# Patient Record
Sex: MALE | Race: WHITE | NOT HISPANIC OR LATINO
[De-identification: names, ages, dates, MRNs, and addresses within clinical notes are randomized per-mention and may not be internally consistent; named-entity substitution may affect disease eponyms.]

---

## 2021-09-08 ENCOUNTER — RESULT REVIEW (OUTPATIENT)
Age: 76
End: 2021-09-08

## 2021-09-13 ENCOUNTER — APPOINTMENT (OUTPATIENT)
Dept: UROLOGY | Facility: CLINIC | Age: 76
End: 2021-09-13
Payer: MEDICARE

## 2021-09-13 ENCOUNTER — NON-APPOINTMENT (OUTPATIENT)
Age: 76
End: 2021-09-13

## 2021-09-13 VITALS
DIASTOLIC BLOOD PRESSURE: 52 MMHG | HEIGHT: 66 IN | TEMPERATURE: 97.6 F | BODY MASS INDEX: 25.71 KG/M2 | SYSTOLIC BLOOD PRESSURE: 104 MMHG | HEART RATE: 73 BPM | WEIGHT: 160 LBS

## 2021-09-13 DIAGNOSIS — Z80.0 FAMILY HISTORY OF MALIGNANT NEOPLASM OF DIGESTIVE ORGANS: ICD-10-CM

## 2021-09-13 DIAGNOSIS — F41.1 GENERALIZED ANXIETY DISORDER: ICD-10-CM

## 2021-09-13 PROBLEM — Z00.00 ENCOUNTER FOR PREVENTIVE HEALTH EXAMINATION: Status: ACTIVE | Noted: 2021-09-13

## 2021-09-13 PROCEDURE — 99203 OFFICE O/P NEW LOW 30 MIN: CPT

## 2021-09-13 PROCEDURE — 51700 IRRIGATION OF BLADDER: CPT

## 2021-09-13 PROCEDURE — A4218: CPT | Mod: NC

## 2021-09-13 RX ORDER — BENZOCAINE/BENZALKONIUM/ALOE/E 5 %-0.13 %
10 CREAM (GRAM) TOPICAL
Refills: 0 | Status: ACTIVE | COMMUNITY

## 2021-09-13 RX ORDER — TAMSULOSIN HYDROCHLORIDE 0.4 MG/1
0.4 CAPSULE ORAL
Refills: 0 | Status: ACTIVE | COMMUNITY

## 2021-09-13 NOTE — ASSESSMENT
[FreeTextEntry1] : Urinary retention:\par passed TOV today\par c/w flomax 1 capsule\par will f/u 1 week for pvr\par \par I advised to hold aspirin for today and may restart tomorrow if his orthopedist is ok.

## 2021-09-13 NOTE — HISTORY OF PRESENT ILLNESS
[Currently Experiencing ___] :  [unfilled] [FreeTextEntry1] : 77yo M s/p recent surgery on R foot/heel on September 8th, galicia was placed post-op, pt was clotting so galicia was replaced several times causing more trauma. \par Pt had stopped his aspirin 325mg bid due to the blood in urine. \par Pt was started on flomax 5 days ago at [facility] and presents today\par He usued to see urologist in past and was advised to start flomax but pt refused earlier. \par Currently he has light pink color urine. Denies fever, chills or abdominal pain\par \par Pt agrees to do tov today

## 2021-09-13 NOTE — PHYSICAL EXAM
[General Appearance - Well Developed] : well developed [General Appearance - Well Nourished] : well nourished [Well Groomed] : well groomed [General Appearance - In No Acute Distress] : no acute distress [Edema] : no peripheral edema [Respiration, Rhythm And Depth] : normal respiratory rhythm and effort [Exaggerated Use Of Accessory Muscles For Inspiration] : no accessory muscle use [Abdomen Soft] : soft [Abdomen Tenderness] : non-tender [Urethral Meatus] : meatus normal [Penis Abnormality] : normal circumcised penis [] : no rash [Oriented To Time, Place, And Person] : oriented to person, place, and time [Affect] : the affect was normal [Mood] : the mood was normal [Not Anxious] : not anxious [FreeTextEntry1] : Cast on his RLE

## 2021-09-13 NOTE — REVIEW OF SYSTEMS
[see HPI] : see HPI [Joint Pain] : joint pain [Joint Stiffness] : joint stiffness [Limb Pain] : limb pain [Difficulty Walking] : difficulty walking [Negative] : Psychiatric

## 2021-09-22 ENCOUNTER — APPOINTMENT (OUTPATIENT)
Dept: UROLOGY | Facility: CLINIC | Age: 76
End: 2021-09-22
Payer: MEDICARE

## 2021-09-22 VITALS
WEIGHT: 160 LBS | SYSTOLIC BLOOD PRESSURE: 107 MMHG | HEIGHT: 66 IN | BODY MASS INDEX: 25.71 KG/M2 | HEART RATE: 78 BPM | TEMPERATURE: 97.6 F | DIASTOLIC BLOOD PRESSURE: 56 MMHG

## 2021-09-22 DIAGNOSIS — R33.9 RETENTION OF URINE, UNSPECIFIED: ICD-10-CM

## 2021-09-22 PROCEDURE — 51798 US URINE CAPACITY MEASURE: CPT

## 2021-09-22 PROCEDURE — 99213 OFFICE O/P EST LOW 20 MIN: CPT

## 2021-09-22 NOTE — PHYSICAL EXAM
[General Appearance - Well Developed] : well developed [General Appearance - Well Nourished] : well nourished [Well Groomed] : well groomed [General Appearance - In No Acute Distress] : no acute distress [Abdomen Soft] : soft [Abdomen Tenderness] : non-tender [Edema] : no peripheral edema [] : no respiratory distress [Respiration, Rhythm And Depth] : normal respiratory rhythm and effort [Exaggerated Use Of Accessory Muscles For Inspiration] : no accessory muscle use [Oriented To Time, Place, And Person] : oriented to person, place, and time [Affect] : the affect was normal [Mood] : the mood was normal [Not Anxious] : not anxious [FreeTextEntry1] : Cast on his RLE

## 2021-09-22 NOTE — REVIEW OF SYSTEMS
[see HPI] : see HPI [Difficulty Walking] : difficulty walking [Negative] : Psychiatric [Joint Pain] : joint pain [Limb Pain] : limb pain

## 2021-09-22 NOTE — HISTORY OF PRESENT ILLNESS
[Currently Experiencing ___] :  [unfilled] [FreeTextEntry1] : 77yo M s/p recent surgery on R foot/heel returns for urinary retention\par Pt is taking 2x flomax and was started on finasteride by his other urologist. \par \par PVR 2cc today\par \par Pt also reports hx of 4-5 neg prostate biopsies for elevated PSA. He will have the documents transferred from other urologist.

## 2021-09-22 NOTE — ASSESSMENT
[FreeTextEntry1] : Urinary retention:\par c/w 2 flomax and finasteride. \par f/u 1 month for pvr\par \par pt will sign release form to forward all previous neg prostate biopsies and MRI reports from other urologist.

## 2021-10-25 ENCOUNTER — APPOINTMENT (OUTPATIENT)
Dept: UROLOGY | Facility: CLINIC | Age: 76
End: 2021-10-25
Payer: MEDICARE

## 2021-10-25 VITALS
DIASTOLIC BLOOD PRESSURE: 56 MMHG | WEIGHT: 126 LBS | BODY MASS INDEX: 20.25 KG/M2 | HEIGHT: 66 IN | SYSTOLIC BLOOD PRESSURE: 108 MMHG | TEMPERATURE: 96.5 F | HEART RATE: 73 BPM

## 2021-10-25 DIAGNOSIS — R97.20 BENIGN PROSTATIC HYPERPLASIA WITHOUT LOWER URINARY TRACT SYMPMS: ICD-10-CM

## 2021-10-25 DIAGNOSIS — N40.0 BENIGN PROSTATIC HYPERPLASIA WITHOUT LOWER URINARY TRACT SYMPMS: ICD-10-CM

## 2021-10-25 PROCEDURE — 51798 US URINE CAPACITY MEASURE: CPT

## 2021-10-25 PROCEDURE — 99213 OFFICE O/P EST LOW 20 MIN: CPT

## 2021-10-25 NOTE — HISTORY OF PRESENT ILLNESS
[FreeTextEntry1] : 75yo M s/p recent surgery on R foot/heel returns for urinary retention\par Pt lowered his tamsulosin to 1 capsule due to dizzy side effect also stopped finasteride due to concern of prostate CA\par \par PVR 24cc today\par \par Pt also reports hx of 4-5 neg prostate biopsies for elevated PSA. \par 4K score 5% on 9/2020\par MRI 2018 showed PIRADS -1 lesion with volume of 105cc [Currently Experiencing ___] :  [unfilled]

## 2021-10-25 NOTE — ASSESSMENT
[FreeTextEntry1] : Urinary retention:\par c/w 1 flomax \par f/u 1 month\par \par Discussed finasteride may be good option for patient for bothersome urinary complaints. He is concerned about prostate CA. I reassured him his prostate biopsies have been negative and has low 4K score along with neg MRI in 2018. He will follow up with his other urologist for checking PSA

## 2021-10-25 NOTE — REVIEW OF SYSTEMS
[see HPI] : see HPI [Joint Pain] : joint pain [Limb Pain] : limb pain [Difficulty Walking] : difficulty walking [Negative] : Psychiatric

## 2022-01-26 ENCOUNTER — APPOINTMENT (OUTPATIENT)
Dept: UROLOGY | Facility: CLINIC | Age: 77
End: 2022-01-26

## 2022-08-28 DIAGNOSIS — M92.61 JUVENILE OSTEOCHONDROSIS OF TARSUS, RIGHT ANKLE: ICD-10-CM

## 2022-08-30 ENCOUNTER — APPOINTMENT (OUTPATIENT)
Dept: ORTHOPEDIC SURGERY | Facility: CLINIC | Age: 77
End: 2022-08-30

## 2022-08-30 VITALS — HEIGHT: 66 IN | BODY MASS INDEX: 19.77 KG/M2 | WEIGHT: 123 LBS

## 2022-08-30 DIAGNOSIS — I35.2 NONRHEUMATIC AORTIC (VALVE) STENOSIS WITH INSUFFICIENCY: ICD-10-CM

## 2022-08-30 DIAGNOSIS — M72.2 PLANTAR FASCIAL FIBROMATOSIS: ICD-10-CM

## 2022-08-30 DIAGNOSIS — M76.61 ACHILLES TENDINITIS, RIGHT LEG: ICD-10-CM

## 2022-08-30 PROCEDURE — 99213 OFFICE O/P EST LOW 20 MIN: CPT

## 2022-08-30 NOTE — ASSESSMENT
[FreeTextEntry1] : 76 yo male presenting with right plantar fasciitis s/p excision of right nicole's deformity. Patient reports intermittent burning sensation at achilles tendon.\par -Discussed with patient that he should continue HEP for plantar fasciitis.\par -Discussed with patient that if pain worsens in achilles or plantar fascia that he may be indicated for PRP injections. Patient states that since his pain is intermittent that he is not interested in injections.\par -Activities as tolerated\par -Rest, ice, compression, elevation, NSAIDs PRN for pain\par -All questions answered\par -F/u PRN\par

## 2022-08-30 NOTE — HISTORY OF PRESENT ILLNESS
[Gradual] : gradual [0] : 0 [Burning] : burning [Intermittent] : intermittent [Rest] : rest [Retired] : Work status: retired [de-identified] : Patient is here for a follow up for his right ankle. Patient had right halgunds excision with achilles tendon reconstruction on 9/8/21. Patient has also been treated for Plantar fasciitis of right foot. Patient would like to discuss some of the SX. Patient states he gets some burning when riding his bike.  [] : Post Surgical Visit: no [FreeTextEntry1] : Right ankle.foot [FreeTextEntry3] : 2021 [FreeTextEntry5] : Patient states NKI

## 2022-08-30 NOTE — PHYSICAL EXAM
[de-identified] : Examination of the right foot and ankle is as follows:\par INSPECTION: well healed scar over achilles tendon, mild swelling over achilles tendon, but no abrasion, no laceration, no erythema, no ecchymosis, no gross deformity, no ecchymosis of foot or ankle\par PALPATION: tenderness over plantar heel\par ROM: dorsiflexion 20 degrees, plantar flexion 40 degrees, inversion 30 degrees, eversion 20 degrees\par STRENGTH: dorsiflexion 5/5, plantarflexion 5/5, inversion 5/5, eversion 5/5, EHL 5/5, FHL 5/5\par TESTING: negative Aviles test\par VASCULAR: dorsalis pedis pulse: 1+, posterior tibialis pulse: 1+\par NEURO: Sensation present to light touch in all distributions\par GAIT: non-antalgic, but patient ambulates without assistive device

## 2022-12-15 ENCOUNTER — APPOINTMENT (OUTPATIENT)
Dept: ORTHOPEDIC SURGERY | Facility: CLINIC | Age: 77
End: 2022-12-15

## 2023-04-18 DIAGNOSIS — G56.03 CARPAL TUNNEL SYNDROM,BILATERAL UPPER LIMBS: ICD-10-CM

## 2023-04-18 DIAGNOSIS — Z86.59 PERSONAL HISTORY OF OTHER MENTAL AND BEHAVIORAL DISORDERS: ICD-10-CM

## 2023-04-18 DIAGNOSIS — E78.5 HYPERLIPIDEMIA, UNSPECIFIED: ICD-10-CM

## 2023-04-18 DIAGNOSIS — I10 ESSENTIAL (PRIMARY) HYPERTENSION: ICD-10-CM

## 2023-04-18 DIAGNOSIS — Z86.79 PERSONAL HISTORY OF OTHER DISEASES OF THE CIRCULATORY SYSTEM: ICD-10-CM

## 2023-04-18 DIAGNOSIS — R00.2 PALPITATIONS: ICD-10-CM

## 2023-04-18 DIAGNOSIS — Z85.828 PERSONAL HISTORY OF OTHER MALIGNANT NEOPLASM OF SKIN: ICD-10-CM

## 2023-04-19 ENCOUNTER — APPOINTMENT (OUTPATIENT)
Dept: VASCULAR SURGERY | Facility: CLINIC | Age: 78
End: 2023-04-19
Payer: MEDICARE

## 2023-04-19 VITALS
SYSTOLIC BLOOD PRESSURE: 118 MMHG | DIASTOLIC BLOOD PRESSURE: 58 MMHG | HEIGHT: 66 IN | BODY MASS INDEX: 20.09 KG/M2 | WEIGHT: 125 LBS

## 2023-04-19 DIAGNOSIS — F19.11 OTHER PSYCHOACTIVE SUBSTANCE ABUSE, IN REMISSION: ICD-10-CM

## 2023-04-19 DIAGNOSIS — Z83.3 FAMILY HISTORY OF DIABETES MELLITUS: ICD-10-CM

## 2023-04-19 DIAGNOSIS — F10.21 ALCOHOL DEPENDENCE, IN REMISSION: ICD-10-CM

## 2023-04-19 DIAGNOSIS — Z86.39 PERSONAL HISTORY OF OTHER ENDOCRINE, NUTRITIONAL AND METABOLIC DISEASE: ICD-10-CM

## 2023-04-19 DIAGNOSIS — Z78.9 OTHER SPECIFIED HEALTH STATUS: ICD-10-CM

## 2023-04-19 DIAGNOSIS — F41.9 ANXIETY DISORDER, UNSPECIFIED: ICD-10-CM

## 2023-04-19 DIAGNOSIS — Z86.59 PERSONAL HISTORY OF OTHER MENTAL AND BEHAVIORAL DISORDERS: ICD-10-CM

## 2023-04-19 DIAGNOSIS — M19.90 UNSPECIFIED OSTEOARTHRITIS, UNSPECIFIED SITE: ICD-10-CM

## 2023-04-19 DIAGNOSIS — F32.A DEPRESSION, UNSPECIFIED: ICD-10-CM

## 2023-04-19 DIAGNOSIS — Z81.8 FAMILY HISTORY OF OTHER MENTAL AND BEHAVIORAL DISORDERS: ICD-10-CM

## 2023-04-19 DIAGNOSIS — Z87.891 PERSONAL HISTORY OF NICOTINE DEPENDENCE: ICD-10-CM

## 2023-04-19 DIAGNOSIS — Z77.22 CONTACT WITH AND (SUSPECTED) EXPOSURE TO ENVIRONMENTAL TOBACCO SMOKE (ACUTE) (CHRONIC): ICD-10-CM

## 2023-04-19 PROCEDURE — 99203 OFFICE O/P NEW LOW 30 MIN: CPT

## 2023-04-19 RX ORDER — IRBESARTAN 75 MG/1
75 TABLET ORAL
Refills: 0 | Status: DISCONTINUED | COMMUNITY
End: 2023-04-19

## 2023-04-19 RX ORDER — TAMSULOSIN HYDROCHLORIDE 0.4 MG/1
0.4 CAPSULE ORAL DAILY
Qty: 180 | Refills: 1 | Status: DISCONTINUED | COMMUNITY
Start: 2021-09-22 | End: 2023-04-19

## 2023-04-19 NOTE — PHYSICAL EXAM
[Carotid Bruits] : carotid bruit  [Murmur] : murmur was appreciated  [Right Carotid Bruit] : right carotid bruit heard [Left Carotid Bruit] : left carotid bruit heard [2+] : left 2+ [No Rash or Lesion] : No rash or lesion [JVD] : no jugular venous distention  [Ankle Swelling (On Exam)] : not present [Varicose Veins Of Lower Extremities] : not present [Abdomen Masses] : No abdominal masses [Abdomen Tenderness] : ~T ~M No abdominal tenderness [de-identified] : Very pleasant, no acute distress.  Patient came with his wife. [de-identified] : Transmitted systolic bruit in both carotid arteries. [de-identified] : 2+ systolic murmur

## 2023-04-19 NOTE — HISTORY OF PRESENT ILLNESS
[FreeTextEntry1] : Being referred by Dr. Sullivan to be evaluated for thoracic aortic aneurysm.  Patient had CT scan of the chest done which showed 4.3 cm ascending aortic aneurysm.  Patient has bicuspid aortic valve which has been monitored by his cardiologist.  Patient is very active 77-year-old gentleman who exercises daily.  He denies any chest pain, palpitation, shortness of breath.  Patient has no family history of the aneurysm.  She is non-smoker for more than 50 years.

## 2023-04-24 PROBLEM — M19.90 ARTHRITIS: Status: ACTIVE | Noted: 2023-04-19

## 2023-04-24 PROBLEM — Z86.39 HISTORY OF HYPERLIPIDEMIA: Status: RESOLVED | Noted: 2023-04-24 | Resolved: 2023-04-24

## 2023-04-24 PROBLEM — F10.21 ALCOHOLISM IN RECOVERY: Status: ACTIVE | Noted: 2022-08-30

## 2023-04-24 PROBLEM — F19.11 HISTORY OF SUBSTANCE ABUSE: Status: RESOLVED | Noted: 2023-04-19 | Resolved: 2023-04-24

## 2023-04-24 PROBLEM — Z81.8 FHX: MENTAL ILLNESS: Status: ACTIVE | Noted: 2023-04-19

## 2023-04-24 PROBLEM — Z78.9 CAFFEINE USE: Status: ACTIVE | Noted: 2023-04-19

## 2023-04-24 PROBLEM — Z77.22 SECONDHAND SMOKE EXPOSURE: Status: ACTIVE | Noted: 2023-04-19

## 2023-04-24 PROBLEM — Z86.59 HISTORY OF OBSESSIVE COMPULSIVE DISORDER: Status: RESOLVED | Noted: 2023-04-19 | Resolved: 2023-04-24

## 2023-04-24 PROBLEM — Z83.3 FAMILY HISTORY OF DIABETES MELLITUS: Status: ACTIVE | Noted: 2023-04-24

## 2023-04-24 PROBLEM — F41.9 ANXIETY: Status: ACTIVE | Noted: 2023-04-19

## 2023-04-24 RX ORDER — PROPRANOLOL HYDROCHLORIDE 10 MG/1
10 TABLET ORAL
Refills: 0 | Status: ACTIVE | COMMUNITY

## 2023-04-24 RX ORDER — IBUPROFEN 200 MG/1
200 TABLET, COATED ORAL
Refills: 0 | Status: ACTIVE | COMMUNITY

## 2023-04-24 RX ORDER — ELECTROLYTES/DEXTROSE
SOLUTION, ORAL ORAL
Refills: 0 | Status: ACTIVE | COMMUNITY

## 2023-04-24 RX ORDER — ATORVASTATIN CALCIUM 20 MG/1
20 TABLET, FILM COATED ORAL DAILY
Refills: 0 | Status: ACTIVE | COMMUNITY

## 2023-04-24 RX ORDER — LORAZEPAM 0.5 MG/1
0.5 TABLET ORAL
Refills: 0 | Status: ACTIVE | COMMUNITY

## 2023-10-01 PROBLEM — G56.03 CARPAL TUNNEL SYNDROME, BILATERAL UPPER LIMBS: Status: RESOLVED | Noted: 2023-04-18 | Resolved: 2023-10-01

## 2024-01-16 ENCOUNTER — APPOINTMENT (OUTPATIENT)
Dept: ORTHOPEDIC SURGERY | Facility: CLINIC | Age: 79
End: 2024-01-16
Payer: MEDICARE

## 2024-01-16 VITALS — BODY MASS INDEX: 21.21 KG/M2 | WEIGHT: 132 LBS | HEIGHT: 66 IN

## 2024-01-16 DIAGNOSIS — M23.92 UNSPECIFIED INTERNAL DERANGEMENT OF LEFT KNEE: ICD-10-CM

## 2024-01-16 DIAGNOSIS — M17.12 UNILATERAL PRIMARY OSTEOARTHRITIS, LEFT KNEE: ICD-10-CM

## 2024-01-16 PROCEDURE — 73562 X-RAY EXAM OF KNEE 3: CPT | Mod: FY,LT

## 2024-01-16 PROCEDURE — 99213 OFFICE O/P EST LOW 20 MIN: CPT

## 2024-01-16 RX ORDER — FAMOTIDINE 10 MG/1
10 TABLET, FILM COATED ORAL
Refills: 0 | Status: DISCONTINUED | COMMUNITY
End: 2024-01-16

## 2024-01-16 NOTE — PHYSICAL EXAM
[de-identified] : Examination of the left knee is as follows: INSPECTION: mild effusion, but no ecchymosis, no erythema, no atrophy, no deformities of quad tendon or of patellar tendon PALPATION: lateral joint line tenderness, but no palpable mass, no obvious defects, no increased warmth, no calf tenderness ROM: flexion 125 degrees, but extension 0 degrees STRENGTH: quadriceps 5/5, hamstring 5/5 TESTING: ligamentously stable NEURO: light touch is intact throughout GAIT: normal gait neg Mc[place]rays  X-rays of the left knee is as follows:  Knee 3 view in the anteroposterior, lateral, skyline views: moderate tricompartmental OA with medial joint space narrowing

## 2024-01-16 NOTE — HISTORY OF PRESENT ILLNESS
[Gradual] : gradual [Constant] : constant [Rest] : rest [Standing] : standing [Retired] : Work status: retired [0] : 0 [Meds] : meds [de-identified] : Patient is here today for his left knee. Pain began 3 months ago. NKI. Patient reports that pain occurred after hiking 5 miles. Reports that the pain is located over lateral knee and superior to patella. Reports that his pain improves with activity and worsens with standing and descending stairs. Reports that he has minimal pain today. Denies taking OTC NSAIDs for his pain but notes that he was taking Advil prior. [] : Post Surgical Visit: no [FreeTextEntry1] : left knee [FreeTextEntry3] : 3 months ago [FreeTextEntry5] : [facility].

## 2024-01-16 NOTE — ASSESSMENT
[FreeTextEntry1] : 77yo M with mild knee OA with possible meniscus tear.  Pain is tolerable at this stage.  Will recommend nonsurgical management at this time.  -Activities as tolerated -Rest, ice, compression, elevation, NSAIDs PRN for pain.  -All questions answered -F/u prn  The diagnosis was explained in detail. The potential non-surgical and surgical treatments were reviewed. The relative risks and benefits of each option were considered relative to the patients age, activity level, medical history, symptom severity and previously attempted treatments.  The patient was advised to consult with their primary medical provider prior to initiation of any new medications to reduce the risk of adverse effects specific to their long-term home medications and medical history. The risk of gastrointestinal irritation and kidney injury specific to long-term NSAID use was discussed.

## 2024-01-26 ENCOUNTER — APPOINTMENT (OUTPATIENT)
Dept: ORTHOPEDIC SURGERY | Facility: CLINIC | Age: 79
End: 2024-01-26
Payer: MEDICARE

## 2024-01-26 PROCEDURE — 72100 X-RAY EXAM L-S SPINE 2/3 VWS: CPT | Mod: FY

## 2024-01-26 PROCEDURE — 99214 OFFICE O/P EST MOD 30 MIN: CPT

## 2024-01-26 PROCEDURE — 73503 X-RAY EXAM HIP UNI 4/> VIEWS: CPT | Mod: FY,RT

## 2024-01-26 NOTE — HISTORY OF PRESENT ILLNESS
[Lower back] : lower back [Sudden] : sudden [Dull/Aching] : dull/aching [Throbbing] : throbbing [Intermittent] : intermittent [Meds] : meds [Heat] : heat [Retired] : Work status: retired [de-identified] : Patient presents today with lower back pain for 5 days after falling on ice. Patient was last seen on 11/4/20.  Patient states he has right sided pain radiating into the right hip and groin. Patient states his legs "feels like rubber bands". Patient states he feels an "electric" sensation the front of his thighs when he passes gas. Patient states he feels increased pain when standing up straight. Patient states he has to squat down to relieve the pressure and tightness. Patient admits to using heat. Patient admits to taking Advil and Tylenol for pain. Patient denies recent imaging.    Patient had bilateral ELISE with Dr. Rader in the [facility]   [] : no

## 2024-01-26 NOTE — IMAGING
[Disc space narrowing] : Disc space narrowing [Spondylolithesis] : Spondylolithesis [Right] : right hip with pelvis [AP] : anteroposterior [Components well fixed, in good position] : Components well fixed, in good position [Lateral] : lateral [FreeTextEntry1] : Spondy L4-5, DDD multilevel

## 2024-01-26 NOTE — ASSESSMENT
[FreeTextEntry1] : 77 yo male presents today for follow up on his lumbar spine. Patient with persistent low back pain following recent fall on the right side. x-rays show multilevel degenerative changes. Recommend MRI for further evaluation.   - Patient given prescription for MRI, follow up after study is completed to discuss results.   - Recommend physical therapy to regain range of motion, strengthening and symptomatic improvement. Prescription given in office today.   - Recommend NSAIDs PRN - Recommend heating pad use to decrease muscle spasm - Discussed the importance of home exercises, including but not limited to hamstring stretching and core strengthening   Patient was educated on their diagnosis today. All questions answered and patient expressed understanding.  Follow up after MRI

## 2024-01-26 NOTE — PHYSICAL EXAM
[de-identified] : Constitutional: Well groomed and developed.  Respiratory: Normal, unlabored breathing. No use of accessory muscles.  Skin: No rashes or ulcers. Skin warm and dry.  Psychiatric: Oriented to time, place, person and event. No acute distress. Gait: Heel to toe Patient able to walk on toes and heels.    Lumbar spine:  Posture: Normal on coronal and sagittal alignment  AROM:  Flexion 40 Extension 5 Moderate pain with simulated truncal motion   Tenderness:  Thoracic: No tenderness on palpation  Lumbar: Moderate tenderness on palpation  Sacrum/coccyx: no tenderness on palpation  Greater trochanteric bursa:  No tenderness  PSIS: None    Motor:                         R             L LE:                                IS                    5             5 Quad              5             5 TA                  5             5 EHL                5             5 Gastroc         5             5                 R  L DTR: Patella  2+  2+ Achilles  2+  2+  Sensory: Light touch sensation intact T12-S1  Babinski's Sign: Negative bilaterally  Straight leg raise test: Negative bilaterally  TOPHER test: negative bilaterally   Examination of the bilateral hips: Strength 5/5 Flexion 90 degrees bilaterally

## 2024-02-09 ENCOUNTER — APPOINTMENT (OUTPATIENT)
Dept: VASCULAR SURGERY | Facility: CLINIC | Age: 79
End: 2024-02-09
Payer: MEDICARE

## 2024-02-09 VITALS
HEIGHT: 66 IN | DIASTOLIC BLOOD PRESSURE: 62 MMHG | BODY MASS INDEX: 21.21 KG/M2 | HEART RATE: 72 BPM | WEIGHT: 132 LBS | OXYGEN SATURATION: 99 % | SYSTOLIC BLOOD PRESSURE: 124 MMHG

## 2024-02-09 DIAGNOSIS — I71.21 ANEURYSM OF THE ASCENDING AORTA, WITHOUT RUPTURE: ICD-10-CM

## 2024-02-09 PROCEDURE — 99214 OFFICE O/P EST MOD 30 MIN: CPT

## 2024-02-09 NOTE — PHYSICAL EXAM
[JVD] : no jugular venous distention  [Carotid Bruits] : carotid bruit  [Normal Breath Sounds] : Normal breath sounds [Murmur] : murmur was appreciated  [2+] : left 2+ [Right Carotid Bruit] : right carotid bruit heard [Left Carotid Bruit] : left carotid bruit heard [1+] : left 1+ [Ankle Swelling (On Exam)] : not present [Varicose Veins Of Lower Extremities] : not present [] : not present [Abdomen Masses] : No abdominal masses [No Rash or Lesion] : No rash or lesion [Anxious] : anxious [de-identified] : No acute distress

## 2024-02-09 NOTE — HISTORY OF PRESENT ILLNESS
[FreeTextEntry1] : Patient is follow-up for an ascending thoracic aneurysm.  Recent chest CTA showed stable 4.2 cm dilatation of the aortic root and the ascending segment.  Patient denies any chest pain.  Blood pressure is under good control.

## 2024-02-09 NOTE — ASSESSMENT
[FreeTextEntry1] : I recommended 1 year follow-up with chest CT.  I spent more than 30 minutes reviewing patient's CAT scan and with the patient.

## 2024-04-04 ENCOUNTER — APPOINTMENT (OUTPATIENT)
Dept: ORTHOPEDIC SURGERY | Facility: CLINIC | Age: 79
End: 2024-04-04
Payer: MEDICARE

## 2024-04-04 VITALS — HEIGHT: 66 IN | WEIGHT: 132 LBS | BODY MASS INDEX: 21.21 KG/M2

## 2024-04-04 DIAGNOSIS — M54.16 RADICULOPATHY, LUMBAR REGION: ICD-10-CM

## 2024-04-04 DIAGNOSIS — M51.36 OTHER INTERVERTEBRAL DISC DEGENERATION, LUMBAR REGION: ICD-10-CM

## 2024-04-04 DIAGNOSIS — M51.37 OTHER INTERVERTEBRAL DISC DEGENERATION, LUMBOSACRAL REGION: ICD-10-CM

## 2024-04-04 DIAGNOSIS — M43.16 SPONDYLOLISTHESIS, LUMBAR REGION: ICD-10-CM

## 2024-04-04 DIAGNOSIS — M47.817 SPONDYLOSIS W/OUT MYELOPATHY OR RADICULOPATHY, LUMBOSACRAL REGION: ICD-10-CM

## 2024-04-04 DIAGNOSIS — M48.061 SPINAL STENOSIS, LUMBAR REGION WITHOUT NEUROGENIC CLAUDICATION: ICD-10-CM

## 2024-04-04 PROCEDURE — 99214 OFFICE O/P EST MOD 30 MIN: CPT

## 2024-04-04 NOTE — DATA REVIEWED
[MRI] : MRI [Lumbar Spine] : lumbar spine [I independently reviewed and interpreted images and report] : I independently reviewed and interpreted images and report [FreeTextEntry1] : 2/2024 MRI of L-Spine was reviewed today and is as follows:  DDD L2-S1 Moderate stenosis L2-3 Moderate stenosis L3-4 Grade 1 spondylolisthesis L4-5 with bilateral foraminal stenosis  Bilateral foraminal stenosis L5-S1

## 2024-04-04 NOTE — HISTORY OF PRESENT ILLNESS
[Lower back] : lower back [Sudden] : sudden [Dull/Aching] : dull/aching [Throbbing] : throbbing [Intermittent] : intermittent [Meds] : meds [Heat] : heat [Retired] : Work status: retired [de-identified] : Follow up lumbar spine MRI Results at [facility]. Patient states he feels an improvement since last visit. Patient admits to finishing PT some relief. Patient admits to doing HEP PRN. Patient admits to taking Advil and Tylenol for pain.  Today's Pain 0/10  [] : no

## 2024-04-04 NOTE — PHYSICAL EXAM
[de-identified] : Constitutional: Well groomed and developed.  Respiratory: Normal, unlabored breathing. No use of accessory muscles.  Skin: No rashes or ulcers. Skin warm and dry.  Psychiatric: Oriented to time, place, person and event. No acute distress. Gait: Heel to toe Patient able to walk on toes and heels.    Lumbar spine:  Posture: Normal on coronal and sagittal alignment  AROM:  Flexion 40 Extension 5 Moderate pain with simulated truncal motion   Tenderness:  Thoracic: No tenderness on palpation  Lumbar: Moderate tenderness on palpation  Sacrum/coccyx: no tenderness on palpation  Greater trochanteric bursa:  No tenderness  PSIS: None    Motor:                         R             L LE:                                IS                    5             5 Quad              5             5 TA                  5             5 EHL                5             5 Gastroc         5             5                 R  L DTR: Patella  2+  2+ Achilles  2+  2+  Sensory: Light touch sensation intact T12-S1  Babinski's Sign: Negative bilaterally  Straight leg raise test: Negative bilaterally  TOPHER test: negative bilaterally   Examination of the bilateral hips: Strength 5/5 Flexion 90 degrees bilaterally

## 2024-04-04 NOTE — ASSESSMENT
[FreeTextEntry1] : 2/2024 MRI of L-Spine was reviewed today and is as follows:  DDD L2-S1 Moderate stenosis L2-3 Moderate stenosis L3-4 Grade 1 spondylolisthesis L4-5 with bilateral foraminal stenosis  Bilateral foraminal stenosis L5-S1  77 yo male presents today for follow up on his lumbar spine. MRI detailed above. Patient with minimal pain at this time following PT and HEP. I recommend patient watch and wait at this time. May proceed with further management if pain returns.    - Patient will continue HEP. Emphasized daily stretching and strengthening.   - Recommend NSAIDs PRN - Recommend heating pad use to decrease muscle spasm - Discussed the importance of home exercises, including but not limited to hamstring stretching and core strengthening   Patient was educated on their diagnosis today. All questions answered and patient expressed understanding.  Follow up PRN

## 2024-04-04 NOTE — IMAGING
[Disc space narrowing] : Disc space narrowing [Spondylolithesis] : Spondylolithesis [Right] : right hip with pelvis [AP] : anteroposterior [Lateral] : lateral [Components well fixed, in good position] : Components well fixed, in good position [FreeTextEntry1] : Spondy L4-5, DDD multilevel

## 2024-07-24 ENCOUNTER — APPOINTMENT (OUTPATIENT)
Dept: ORTHOPEDIC SURGERY | Facility: CLINIC | Age: 79
End: 2024-07-24
Payer: MEDICARE

## 2024-07-24 VITALS — HEIGHT: 66 IN | BODY MASS INDEX: 21.21 KG/M2 | WEIGHT: 132 LBS

## 2024-07-24 DIAGNOSIS — M25.521 PAIN IN RIGHT ELBOW: ICD-10-CM

## 2024-07-24 PROCEDURE — 99203 OFFICE O/P NEW LOW 30 MIN: CPT

## 2024-07-24 PROCEDURE — 99213 OFFICE O/P EST LOW 20 MIN: CPT

## 2024-07-25 PROBLEM — M25.521 RIGHT ELBOW PAIN: Status: ACTIVE | Noted: 2024-07-25

## 2024-07-25 NOTE — IMAGING
[de-identified] : RIGHT ELBOW EXAM Minimal ttp at lateral epicondyle and distal biceps Skin intact No deformity, edema, or ecchymosis Hand with brisk capillary refill, warm and well perfused Motor function intact to AIN, PIN, ulnar nerves Sensation intact median, ulnar, radial nerves  Elbow ROM   Flexion 135   Extension to 0   Supination 85   Pronation 85  No elbow instability noted Strength for elbow flexion & extension is 5/5 Hand and wrist motion is intact and full Patient able to make a composite fist

## 2024-07-25 NOTE — HISTORY OF PRESENT ILLNESS
[de-identified] : Age: 79 year M PMHx: HTN, HLD Hand Dominance: RHD Chief Complaint: Right elbow pain s/p overexertion approx. 07/10/24. Patient reports that he was pushing the lawnmower for 2.5 hours and notes that his right elbow felt tight. Patient states that he was unable to extend his right elbow due to discomfort. Patient states that his symptoms have improved since the initial injury, but would still like to get evaluated as he is still unable to fully extend his right arm. Denies numbness/tingling.   Trauma: s/p overexertion Outside Imaging/Treatment: none OTC Medications: Advil PRN with relief OT/PT: none Bracing: none Pain worse with: exertion, extension of the right arm Pain better with: rest

## 2024-07-25 NOTE — ASSESSMENT
[FreeTextEntry1] : Right Lateral Epicondylitis and distal biceps tendonitis The diagnosis of lateral epicondylitis and treatment options were discussed at length with the patient today.  I discussed that in terms of the natural history of the condition, it can sometimes take many months to improve.  I discussed treatment options including observation, activity modification including avoiding lifting with the hand pronated and instead lifting with the hand supinated, oral anti-inflammatories, hand therapy, counterforce brace, and steroid injection.  I discussed that for the steroid injection, the literature proves this to be no better than a placebo, however, it is still a potential option for the patient.  Furthermore, if this condition is recalcitrant, I discussed obtaining an MRI scan to assess both the lateral epicondyle as well as the radiocapitellar joint and specifically for a potential plica that could be impinging on the radiocapitellar joint. All of the patient's questions were answered to their satisfaction.  F/u prn

## 2024-07-25 NOTE — HISTORY OF PRESENT ILLNESS
[de-identified] : Age: 79 year M PMHx: HTN, HLD Hand Dominance: RHD Chief Complaint: Right elbow pain s/p overexertion approx. 07/10/24. Patient reports that he was pushing the lawnmower for 2.5 hours and notes that his right elbow felt tight. Patient states that he was unable to extend his right elbow due to discomfort. Patient states that his symptoms have improved since the initial injury, but would still like to get evaluated as he is still unable to fully extend his right arm. Denies numbness/tingling.   Trauma: s/p overexertion Outside Imaging/Treatment: none OTC Medications: Advil PRN with relief OT/PT: none Bracing: none Pain worse with: exertion, extension of the right arm Pain better with: rest

## 2024-07-25 NOTE — IMAGING
[de-identified] : RIGHT ELBOW EXAM Minimal ttp at lateral epicondyle and distal biceps Skin intact No deformity, edema, or ecchymosis Hand with brisk capillary refill, warm and well perfused Motor function intact to AIN, PIN, ulnar nerves Sensation intact median, ulnar, radial nerves  Elbow ROM   Flexion 135   Extension to 0   Supination 85   Pronation 85  No elbow instability noted Strength for elbow flexion & extension is 5/5 Hand and wrist motion is intact and full Patient able to make a composite fist

## 2024-12-04 ENCOUNTER — APPOINTMENT (OUTPATIENT)
Dept: ORTHOPEDIC SURGERY | Facility: CLINIC | Age: 79
End: 2024-12-04
Payer: MEDICARE

## 2024-12-04 DIAGNOSIS — M54.16 RADICULOPATHY, LUMBAR REGION: ICD-10-CM

## 2024-12-04 DIAGNOSIS — M51.379 OTH INTVRT DISC DEGEN, LUMBOSACR W/O LUM BCK OR LW EXTRM PN: ICD-10-CM

## 2024-12-04 DIAGNOSIS — M47.817 SPONDYLOSIS W/OUT MYELOPATHY OR RADICULOPATHY, LUMBOSACRAL REGION: ICD-10-CM

## 2024-12-04 DIAGNOSIS — M51.369: ICD-10-CM

## 2024-12-04 DIAGNOSIS — M48.061 SPINAL STENOSIS, LUMBAR REGION WITHOUT NEUROGENIC CLAUDICATION: ICD-10-CM

## 2024-12-04 DIAGNOSIS — M43.16 SPONDYLOLISTHESIS, LUMBAR REGION: ICD-10-CM

## 2024-12-04 PROCEDURE — 99214 OFFICE O/P EST MOD 30 MIN: CPT

## 2025-03-05 ENCOUNTER — APPOINTMENT (OUTPATIENT)
Dept: ORTHOPEDIC SURGERY | Facility: CLINIC | Age: 80
End: 2025-03-05
Payer: MEDICARE

## 2025-03-05 DIAGNOSIS — M51.369: ICD-10-CM

## 2025-03-05 DIAGNOSIS — M43.16 SPONDYLOLISTHESIS, LUMBAR REGION: ICD-10-CM

## 2025-03-05 DIAGNOSIS — M48.061 SPINAL STENOSIS, LUMBAR REGION WITHOUT NEUROGENIC CLAUDICATION: ICD-10-CM

## 2025-03-05 DIAGNOSIS — M54.16 RADICULOPATHY, LUMBAR REGION: ICD-10-CM

## 2025-03-05 DIAGNOSIS — M47.817 SPONDYLOSIS W/OUT MYELOPATHY OR RADICULOPATHY, LUMBOSACRAL REGION: ICD-10-CM

## 2025-03-05 DIAGNOSIS — M51.379 OTH INTVRT DISC DEGEN, LUMBOSACR W/O LUM BCK OR LW EXTRM PN: ICD-10-CM

## 2025-03-05 PROCEDURE — 99213 OFFICE O/P EST LOW 20 MIN: CPT

## 2025-05-15 ENCOUNTER — APPOINTMENT (OUTPATIENT)
Dept: ORTHOPEDIC SURGERY | Facility: CLINIC | Age: 80
End: 2025-05-15
Payer: MEDICARE

## 2025-05-15 DIAGNOSIS — M43.16 SPONDYLOLISTHESIS, LUMBAR REGION: ICD-10-CM

## 2025-05-15 DIAGNOSIS — M48.061 SPINAL STENOSIS, LUMBAR REGION WITHOUT NEUROGENIC CLAUDICATION: ICD-10-CM

## 2025-05-15 DIAGNOSIS — M51.379 OTH INTVRT DISC DEGEN, LUMBOSACR W/O LUM BCK OR LW EXTRM PN: ICD-10-CM

## 2025-05-15 DIAGNOSIS — M51.369: ICD-10-CM

## 2025-05-15 DIAGNOSIS — M54.16 RADICULOPATHY, LUMBAR REGION: ICD-10-CM

## 2025-05-15 DIAGNOSIS — M47.817 SPONDYLOSIS W/OUT MYELOPATHY OR RADICULOPATHY, LUMBOSACRAL REGION: ICD-10-CM

## 2025-05-15 PROCEDURE — 99213 OFFICE O/P EST LOW 20 MIN: CPT

## 2025-08-29 ENCOUNTER — APPOINTMENT (OUTPATIENT)
Dept: VASCULAR SURGERY | Facility: CLINIC | Age: 80
End: 2025-08-29
Payer: MEDICARE

## 2025-08-29 VITALS
HEART RATE: 80 BPM | HEIGHT: 66 IN | OXYGEN SATURATION: 98 % | DIASTOLIC BLOOD PRESSURE: 66 MMHG | SYSTOLIC BLOOD PRESSURE: 124 MMHG | BODY MASS INDEX: 21.21 KG/M2 | WEIGHT: 132 LBS

## 2025-08-29 DIAGNOSIS — I71.21 ANEURYSM OF THE ASCENDING AORTA, WITHOUT RUPTURE: ICD-10-CM

## 2025-08-29 PROCEDURE — 99213 OFFICE O/P EST LOW 20 MIN: CPT
